# Patient Record
Sex: FEMALE | Employment: UNEMPLOYED | URBAN - METROPOLITAN AREA
[De-identification: names, ages, dates, MRNs, and addresses within clinical notes are randomized per-mention and may not be internally consistent; named-entity substitution may affect disease eponyms.]

---
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
## 2023-08-11 ENCOUNTER — HOSPITAL ENCOUNTER (EMERGENCY)
Age: 23
Discharge: HOME | End: 2023-08-12
Payer: COMMERCIAL

## 2023-08-11 DIAGNOSIS — R10.2 PELVIC CRAMPING: ICD-10-CM

## 2023-08-11 DIAGNOSIS — Z20.2 POSSIBLE EXPOSURE TO STD: ICD-10-CM

## 2023-08-11 DIAGNOSIS — N76.0 BACTERIAL VAGINOSIS: Primary | ICD-10-CM

## 2023-08-11 DIAGNOSIS — B96.89 BACTERIAL VAGINOSIS: Primary | ICD-10-CM

## 2023-08-11 PROCEDURE — 99284 EMERGENCY DEPT VISIT MOD MDM: CPT

## 2023-08-11 PROCEDURE — 87210 SMEAR WET MOUNT SALINE/INK: CPT

## 2023-08-11 PROCEDURE — 87491 CHLMYD TRACH DNA AMP PROBE: CPT

## 2023-08-11 PROCEDURE — 87591 N.GONORRHOEAE DNA AMP PROB: CPT

## 2023-08-11 PROCEDURE — 87086 URINE CULTURE/COLONY COUNT: CPT

## 2023-08-11 PROCEDURE — 81001 URINALYSIS AUTO W/SCOPE: CPT

## 2023-08-11 ASSESSMENT — LIFESTYLE VARIABLES: HOW OFTEN DO YOU HAVE A DRINK CONTAINING ALCOHOL: NEVER

## 2023-08-12 VITALS
BODY MASS INDEX: 33.97 KG/M2 | WEIGHT: 230 LBS | HEART RATE: 96 BPM | OXYGEN SATURATION: 99 % | SYSTOLIC BLOOD PRESSURE: 158 MMHG | DIASTOLIC BLOOD PRESSURE: 75 MMHG | RESPIRATION RATE: 16 BRPM | TEMPERATURE: 97.6 F

## 2023-08-12 LAB
BACTERIA URNS QL MICRO: ABNORMAL
BILIRUB UR QL STRIP: NEGATIVE
CLARITY UR: ABNORMAL
CLUE CELLS VAG QL WET PREP: ABNORMAL
COLOR UR: YELLOW
EPI CELLS #/AREA URNS HPF: ABNORMAL /HPF
GLUCOSE UR STRIP-MCNC: NEGATIVE MG/DL
HCG, URINE, POC: NEGATIVE
HGB UR QL STRIP.AUTO: ABNORMAL
KETONES UR STRIP-MCNC: NEGATIVE MG/DL
LEUKOCYTE ESTERASE UR QL STRIP: ABNORMAL
Lab: NORMAL
NEGATIVE QC PASS/FAIL: NORMAL
NITRITE UR QL STRIP: NEGATIVE
PH UR STRIP: 6 [PH] (ref 5–9)
POSITIVE QC PASS/FAIL: NORMAL
PROT UR STRIP-MCNC: NEGATIVE MG/DL
RBC #/AREA URNS HPF: ABNORMAL /HPF
SOURCE WET PREP: ABNORMAL
SP GR UR STRIP: 1.02 (ref 1–1.03)
T VAGINALIS VAG QL WET PREP: ABNORMAL
UROBILINOGEN UR STRIP-ACNC: 0.2 EU/DL (ref 0–1)
WBC #/AREA URNS HPF: ABNORMAL /HPF
YEAST WET PREP: ABNORMAL

## 2023-08-12 PROCEDURE — 96372 THER/PROPH/DIAG INJ SC/IM: CPT

## 2023-08-12 PROCEDURE — 6360000002 HC RX W HCPCS: Performed by: PHYSICIAN ASSISTANT

## 2023-08-12 PROCEDURE — 6370000000 HC RX 637 (ALT 250 FOR IP): Performed by: PHYSICIAN ASSISTANT

## 2023-08-12 RX ORDER — METRONIDAZOLE 500 MG/1
500 TABLET ORAL ONCE
Status: COMPLETED | OUTPATIENT
Start: 2023-08-12 | End: 2023-08-12

## 2023-08-12 RX ORDER — CEFTRIAXONE 500 MG/1
500 INJECTION, POWDER, FOR SOLUTION INTRAMUSCULAR; INTRAVENOUS ONCE
Status: COMPLETED | OUTPATIENT
Start: 2023-08-12 | End: 2023-08-12

## 2023-08-12 RX ORDER — METRONIDAZOLE 500 MG/1
500 TABLET ORAL 2 TIMES DAILY
Qty: 14 TABLET | Refills: 0 | Status: SHIPPED | OUTPATIENT
Start: 2023-08-12 | End: 2023-08-19

## 2023-08-12 RX ORDER — AZITHROMYCIN 250 MG/1
1000 TABLET, FILM COATED ORAL ONCE
Status: COMPLETED | OUTPATIENT
Start: 2023-08-12 | End: 2023-08-12

## 2023-08-12 RX ADMIN — METRONIDAZOLE 500 MG: 500 TABLET ORAL at 00:39

## 2023-08-12 RX ADMIN — CEFTRIAXONE SODIUM 500 MG: 500 INJECTION, POWDER, FOR SOLUTION INTRAMUSCULAR; INTRAVENOUS at 00:37

## 2023-08-12 RX ADMIN — AZITHROMYCIN 1000 MG: 250 TABLET, FILM COATED ORAL at 00:37

## 2023-08-12 ASSESSMENT — ENCOUNTER SYMPTOMS
ABDOMINAL PAIN: 0
SHORTNESS OF BREATH: 0
COLOR CHANGE: 0
CONSTIPATION: 0
COUGH: 0
NAUSEA: 0
CHEST TIGHTNESS: 0
BACK PAIN: 0
VOMITING: 0
DIARRHEA: 0

## 2023-08-13 LAB
MICROORGANISM SPEC CULT: NO GROWTH
SPECIMEN DESCRIPTION: NORMAL

## 2023-08-15 LAB
C TRACH DNA SPEC QL PROBE+SIG AMP: NEGATIVE
N GONORRHOEA DNA SPEC QL PROBE+SIG AMP: NEGATIVE
SPECIMEN DESCRIPTION: NORMAL

## 2023-08-25 ENCOUNTER — OFFICE VISIT (OUTPATIENT)
Age: 23
End: 2023-08-25

## 2023-08-25 VITALS
HEART RATE: 94 BPM | BODY MASS INDEX: 34.35 KG/M2 | SYSTOLIC BLOOD PRESSURE: 119 MMHG | HEIGHT: 69 IN | WEIGHT: 231.9 LBS | DIASTOLIC BLOOD PRESSURE: 82 MMHG

## 2023-08-25 DIAGNOSIS — N94.6 DYSMENORRHEA: Primary | ICD-10-CM

## 2023-08-25 RX ORDER — ETHYNODIOL DIACETATE AND ETHINYL ESTRADIOL 1 MG-35MCG
1 KIT ORAL DAILY
Qty: 3 PACKET | Refills: 4 | Status: SHIPPED | OUTPATIENT
Start: 2023-08-25

## 2023-08-25 SDOH — ECONOMIC STABILITY: HOUSING INSECURITY
IN THE LAST 12 MONTHS, WAS THERE A TIME WHEN YOU DID NOT HAVE A STEADY PLACE TO SLEEP OR SLEPT IN A SHELTER (INCLUDING NOW)?: NO

## 2023-08-25 SDOH — ECONOMIC STABILITY: FOOD INSECURITY: WITHIN THE PAST 12 MONTHS, YOU WORRIED THAT YOUR FOOD WOULD RUN OUT BEFORE YOU GOT MONEY TO BUY MORE.: NEVER TRUE

## 2023-08-25 SDOH — ECONOMIC STABILITY: FOOD INSECURITY: WITHIN THE PAST 12 MONTHS, THE FOOD YOU BOUGHT JUST DIDN'T LAST AND YOU DIDN'T HAVE MONEY TO GET MORE.: NEVER TRUE

## 2023-08-25 SDOH — ECONOMIC STABILITY: INCOME INSECURITY: HOW HARD IS IT FOR YOU TO PAY FOR THE VERY BASICS LIKE FOOD, HOUSING, MEDICAL CARE, AND HEATING?: NOT HARD AT ALL

## 2023-08-25 ASSESSMENT — PATIENT HEALTH QUESTIONNAIRE - PHQ9
2. FEELING DOWN, DEPRESSED OR HOPELESS: 0
SUM OF ALL RESPONSES TO PHQ QUESTIONS 1-9: 0

## 2023-08-25 ASSESSMENT — ENCOUNTER SYMPTOMS: BACK PAIN: 1

## 2023-10-03 ENCOUNTER — TELEPHONE (OUTPATIENT)
Age: 23
End: 2023-10-03

## 2023-10-03 NOTE — TELEPHONE ENCOUNTER
10/3/2023    V[facility] Bernard   [phone]    No LMP recorded. 108/3/23  Birth Control: Not on birthcontrol    Pain: Patients with UTI Sx / Abd Pain   OB patient No   Nausea: No   Chills: No   Passing gas: Yes   Fever: No   Temp: No temp   Location of pain: No Pain   Duration of pain: 0 Pain     Medications:  Current Outpatient Medications   Medication Sig Dispense Refill    ethynodiol-ethinyl estradiol (ZOVIA 1/35E, 28,) 1-35 MG-MCG per tablet Take 1 tablet by mouth daily 3 packet 4     No current facility-administered medications for this visit. Bleeding: How much: Normal on period   Duration: 1 day   Number of pads used daily: 11    Other complaints: Pt thinks she has a yeast infection. She has some burning and itching. Wanted to see if you would call in an antibiotic. Pharmacy: [facility].    Allergies: Sulfa antibiotics   Allergies   Allergen Reactions    Sulfa Antibiotics      Other reaction(s): Unknown  Other reaction(s): FACIAL SWELLING

## 2023-10-03 NOTE — TELEPHONE ENCOUNTER
Yeast infections are not treated with antibiotics.   If she thinks she has a yeast infection, I would be happy to see her for a pelvic exam, if she does not want to try Monistat over-the-counter first.

## 2023-10-11 ENCOUNTER — OFFICE VISIT (OUTPATIENT)
Age: 23
End: 2023-10-11
Payer: COMMERCIAL

## 2023-10-11 VITALS
SYSTOLIC BLOOD PRESSURE: 113 MMHG | HEIGHT: 69 IN | BODY MASS INDEX: 34.07 KG/M2 | HEART RATE: 108 BPM | WEIGHT: 230 LBS | DIASTOLIC BLOOD PRESSURE: 80 MMHG

## 2023-10-11 DIAGNOSIS — N89.8 VAGINAL ITCHING: Primary | ICD-10-CM

## 2023-10-11 PROCEDURE — 99213 OFFICE O/P EST LOW 20 MIN: CPT | Performed by: LEGAL MEDICINE

## 2023-10-11 RX ORDER — DOXYCYCLINE HYCLATE 100 MG/1
100 CAPSULE ORAL 2 TIMES DAILY
COMMUNITY
Start: 2023-09-26

## 2023-10-11 RX ORDER — FLUCONAZOLE 150 MG/1
TABLET ORAL
Qty: 1 TABLET | Refills: 0 | Status: SHIPPED | OUTPATIENT
Start: 2023-10-11

## 2023-10-11 RX ORDER — CLOTRIMAZOLE AND BETAMETHASONE DIPROPIONATE 10; .64 MG/G; MG/G
CREAM TOPICAL
Qty: 30 G | Refills: 1 | Status: SHIPPED | OUTPATIENT
Start: 2023-10-11

## 2023-10-11 ASSESSMENT — ENCOUNTER SYMPTOMS
ABDOMINAL DISTENTION: 0
NAUSEA: 0
DIARRHEA: 0
ABDOMINAL PAIN: 0
VOMITING: 0
BLOOD IN STOOL: 0
RECTAL PAIN: 0
CONSTIPATION: 0

## 2023-10-11 NOTE — PATIENT INSTRUCTIONS
Boric acid 600 mg per vagina every evening for 14 days. Can find through [facility]. Blood test to check for diabetes was ordered for you.

## 2023-10-13 LAB
CULTURE: NORMAL
SPECIMEN DESCRIPTION: NORMAL

## 2023-10-14 LAB
C TRACH DNA GENITAL QL NAA+PROBE: NEGATIVE
N. GONORRHOEAE DNA: NEGATIVE

## 2023-11-22 ENCOUNTER — TELEPHONE (OUTPATIENT)
Age: 23
End: 2023-11-22

## 2024-03-15 ENCOUNTER — TELEPHONE (OUTPATIENT)
Age: 24
End: 2024-03-15

## 2024-03-15 NOTE — TELEPHONE ENCOUNTER
Patient called in stating she was informed by her partner that he has syphilis today , c/o all over itching and vaginal lesion that burns when she urinates. and she will need treated .patient also states she would like to be tested HSV 2. Please advise?

## 2024-03-16 ENCOUNTER — HOSPITAL ENCOUNTER (EMERGENCY)
Facility: HOSPITAL | Age: 24
Discharge: HOME | End: 2024-03-17
Attending: STUDENT IN AN ORGANIZED HEALTH CARE EDUCATION/TRAINING PROGRAM
Payer: COMMERCIAL

## 2024-03-16 VITALS
DIASTOLIC BLOOD PRESSURE: 85 MMHG | OXYGEN SATURATION: 99 % | BODY MASS INDEX: 31.1 KG/M2 | RESPIRATION RATE: 98 BRPM | TEMPERATURE: 95.7 F | HEART RATE: 111 BPM | WEIGHT: 210 LBS | SYSTOLIC BLOOD PRESSURE: 125 MMHG | HEIGHT: 69 IN

## 2024-03-16 DIAGNOSIS — N76.0 BACTERIAL VAGINOSIS: ICD-10-CM

## 2024-03-16 DIAGNOSIS — N76.6 GENITAL LABIAL ULCER: Primary | ICD-10-CM

## 2024-03-16 DIAGNOSIS — B96.89 BACTERIAL VAGINOSIS: ICD-10-CM

## 2024-03-16 PROCEDURE — 87529 HSV DNA AMP PROBE: CPT | Mod: PORLAB | Performed by: STUDENT IN AN ORGANIZED HEALTH CARE EDUCATION/TRAINING PROGRAM

## 2024-03-16 PROCEDURE — 86780 TREPONEMA PALLIDUM: CPT | Mod: PORLAB | Performed by: STUDENT IN AN ORGANIZED HEALTH CARE EDUCATION/TRAINING PROGRAM

## 2024-03-16 PROCEDURE — 81001 URINALYSIS AUTO W/SCOPE: CPT | Performed by: STUDENT IN AN ORGANIZED HEALTH CARE EDUCATION/TRAINING PROGRAM

## 2024-03-16 PROCEDURE — 36415 COLL VENOUS BLD VENIPUNCTURE: CPT | Performed by: STUDENT IN AN ORGANIZED HEALTH CARE EDUCATION/TRAINING PROGRAM

## 2024-03-16 PROCEDURE — 87800 DETECT AGNT MULT DNA DIREC: CPT | Mod: PORLAB | Performed by: STUDENT IN AN ORGANIZED HEALTH CARE EDUCATION/TRAINING PROGRAM

## 2024-03-16 PROCEDURE — 99283 EMERGENCY DEPT VISIT LOW MDM: CPT

## 2024-03-16 PROCEDURE — 87389 HIV-1 AG W/HIV-1&-2 AB AG IA: CPT | Mod: PORLAB | Performed by: STUDENT IN AN ORGANIZED HEALTH CARE EDUCATION/TRAINING PROGRAM

## 2024-03-16 PROCEDURE — 87210 SMEAR WET MOUNT SALINE/INK: CPT | Performed by: STUDENT IN AN ORGANIZED HEALTH CARE EDUCATION/TRAINING PROGRAM

## 2024-03-16 PROCEDURE — 87109 MYCOPLASMA: CPT | Performed by: STUDENT IN AN ORGANIZED HEALTH CARE EDUCATION/TRAINING PROGRAM

## 2024-03-16 PROCEDURE — 81025 URINE PREGNANCY TEST: CPT | Performed by: STUDENT IN AN ORGANIZED HEALTH CARE EDUCATION/TRAINING PROGRAM

## 2024-03-16 ASSESSMENT — COLUMBIA-SUICIDE SEVERITY RATING SCALE - C-SSRS
2. HAVE YOU ACTUALLY HAD ANY THOUGHTS OF KILLING YOURSELF?: NO
6. HAVE YOU EVER DONE ANYTHING, STARTED TO DO ANYTHING, OR PREPARED TO DO ANYTHING TO END YOUR LIFE?: NO
1. IN THE PAST MONTH, HAVE YOU WISHED YOU WERE DEAD OR WISHED YOU COULD GO TO SLEEP AND NOT WAKE UP?: NO

## 2024-03-16 ASSESSMENT — PAIN - FUNCTIONAL ASSESSMENT: PAIN_FUNCTIONAL_ASSESSMENT: 0-10

## 2024-03-16 ASSESSMENT — PAIN SCALES - GENERAL: PAINLEVEL_OUTOF10: 0 - NO PAIN

## 2024-03-17 LAB
APPEARANCE UR: CLEAR
BILIRUB UR STRIP.AUTO-MCNC: NEGATIVE MG/DL
C TRACH RRNA SPEC QL NAA+PROBE: NEGATIVE
CLUE CELLS SPEC QL WET PREP: PRESENT
COLOR UR: YELLOW
GLUCOSE UR STRIP.AUTO-MCNC: NEGATIVE MG/DL
HCG UR QL IA.RAPID: NEGATIVE
HIV 1+2 AB+HIV1 P24 AG SERPL QL IA: NONREACTIVE
HSV1 DNA SKIN QL NAA+PROBE: NOT DETECTED
HSV2 DNA SKIN QL NAA+PROBE: NOT DETECTED
KETONES UR STRIP.AUTO-MCNC: NEGATIVE MG/DL
LEUKOCYTE ESTERASE UR QL STRIP.AUTO: ABNORMAL
N GONORRHOEA DNA SPEC QL PROBE+SIG AMP: NEGATIVE
NITRITE UR QL STRIP.AUTO: NEGATIVE
PH UR STRIP.AUTO: 7 [PH]
PROT UR STRIP.AUTO-MCNC: NEGATIVE MG/DL
RBC # UR STRIP.AUTO: NEGATIVE /UL
RBC #/AREA URNS AUTO: NORMAL /HPF
SP GR UR STRIP.AUTO: 1.02
SQUAMOUS #/AREA URNS AUTO: NORMAL /HPF
T VAGINALIS SPEC QL WET PREP: ABNORMAL
TREPONEMA PALLIDUM IGG+IGM AB [PRESENCE] IN SERUM OR PLASMA BY IMMUNOASSAY: NONREACTIVE
UROBILINOGEN UR STRIP.AUTO-MCNC: <2 MG/DL
WBC #/AREA URNS AUTO: NORMAL /HPF
WBC VAG QL WET PREP: ABNORMAL
YEAST VAG QL WET PREP: ABNORMAL

## 2024-03-17 PROCEDURE — 2500000004 HC RX 250 GENERAL PHARMACY W/ HCPCS (ALT 636 FOR OP/ED): Mod: JZ | Performed by: STUDENT IN AN ORGANIZED HEALTH CARE EDUCATION/TRAINING PROGRAM

## 2024-03-17 PROCEDURE — 96372 THER/PROPH/DIAG INJ SC/IM: CPT

## 2024-03-17 RX ORDER — METRONIDAZOLE 500 MG/1
500 TABLET ORAL 2 TIMES DAILY
Qty: 14 TABLET | Refills: 0 | Status: SHIPPED | OUTPATIENT
Start: 2024-03-17 | End: 2024-03-24

## 2024-03-17 RX ADMIN — PENICILLIN G BENZATHINE 2.4 MILLION UNITS: 1200000 INJECTION, SUSPENSION INTRAMUSCULAR at 00:30

## 2024-03-17 NOTE — ED PROVIDER NOTES
Chief Complaint   Patient presents with    Exposure to STD     Std exposure 2 weeks ago, yellow discharge, dysuria and lesion         HPI:  24 y.o. female presenting to the ED with concern for STI exposure.  Patient reports her significant other test for syphilis and mycoplasma.  She was just recently notified of this.  She had developed a lesion which was swabbed [facility] and she was started on acyclovir for this and is currently on day 9/10.  She says lesion is painful but only with urination. She does report mild burning pain all over her body.  She has also had yellow vaginal discharge, which is new over the past 4 days.  No unusual vaginal bleeding.  No abdominal pain, nausea, or vomiting.    History provided by: patient  Limitations to history: none    ------------------------------------------------------------------------------------------------------------------------------------------    ED Triage Vitals [03/16/24 2205]   Temperature Heart Rate Respirations BP   35.4 °C (95.7 °F) (!) 111 (!) 98 125/85      Pulse Ox Temp Source Heart Rate Source Patient Position   99 % Temporal Monitor --      BP Location FiO2 (%)     -- --         Physical Exam:  Triage vitals reviewed.  Constitutional: Well developed adult in no acute distress, non toxic in appearance  Head: Normocephalic, atraumatic  Skin: Intact, dry. No rashes or lesions.  Eyes: Pupils are equal. No conjunctival injection.  Neck: Supple. Trachea is midline.  Pulmonary: Normal work of breathing with no accessory muscle use noted.  Clear to auscultation bilaterally.   Cardiovascular: RRR. 2+ radial pulses bilaterally.   Abdomen: Soft, nondistended. Nontender to palpation.  : Healing lesion on border of labia majora and perineum just right of midline.  White mucus discharge noted in posterior vaginal vault.  No cervical lesions.  No CMT or adnexal tenderness.  Extremities: No gross deformities.  Moving all extremities spontaneously without  difficulty.  Neuro: Awake and alert. Face is symmetric.  Speech is clear. No obvious focal findings.  Psych: Appropriate mood and affect.    ------------------------------------------------------------------------------------------------------------------------------------------    Medical Decision Making:  This patient is a 24 y.o. female who is presenting to the ED for STI check after known exposure to syphilis and mycoplasma.  Additionally was recently diagnosed with HSV and is completing treatment for about.  On exam she does have a healing lesion that is not vesicular but could be a small ulceration versus tear.  She also has some white mucus-like discharge.  Wet prep and swabs for G/C, HSV, and mycoplasma were sent.  Serum testing for syphilis and HIV also obtained.  Patient is requesting treatment for syphilis given her partner was positive and was given a single dose of penicillin IM.  She did test positive for clue cells and was given a prescription for metronidazole, as well as counseled on side effects.  Was encouraged to complete her acyclovir for prior positive HSV test.  She was offered gonorrhea/chlamydia preemptive treatment but would like to wait for these results to return.    Patient was instructed on how to follow-up on her results in [facility] and may require return to ED if results are positive. She was instructed to abstain from sexual activity until results have returned and if positive until she has completed treatment and all partners are tested. She expressed understanding and all questions were answered.  Discharged in stable condition.    Diagnoses as of 03/30/24 2240   Genital labial ulcer   Bacterial vaginosis        Clinical Impression:  Labial ulcer  Bacterial vaginosis  Exposure to STI    Disposition:  Discharge to home    Marianela Ron DO  Emergency Medicine         Marianela Ron DO  03/30/24 3949

## 2024-03-22 LAB — UREAPLASMA/MYCOPLASMA CULTURE: NORMAL

## 2024-08-21 ENCOUNTER — APPOINTMENT (OUTPATIENT)
Dept: OBSTETRICS AND GYNECOLOGY | Facility: CLINIC | Age: 24
End: 2024-08-21
Payer: COMMERCIAL

## 2024-08-21 ENCOUNTER — LAB (OUTPATIENT)
Dept: LAB | Facility: LAB | Age: 24
End: 2024-08-21
Payer: COMMERCIAL

## 2024-08-21 VITALS
DIASTOLIC BLOOD PRESSURE: 70 MMHG | SYSTOLIC BLOOD PRESSURE: 120 MMHG | WEIGHT: 180 LBS | BODY MASS INDEX: 26.66 KG/M2 | HEIGHT: 69 IN

## 2024-08-21 DIAGNOSIS — Z11.3 SCREENING FOR STDS (SEXUALLY TRANSMITTED DISEASES): ICD-10-CM

## 2024-08-21 DIAGNOSIS — N89.8 VAGINAL ITCHING: ICD-10-CM

## 2024-08-21 DIAGNOSIS — R35.0 URINARY FREQUENCY: ICD-10-CM

## 2024-08-21 DIAGNOSIS — Z01.419 WELL WOMAN EXAM WITH ROUTINE GYNECOLOGICAL EXAM: Primary | ICD-10-CM

## 2024-08-21 PROBLEM — B00.9 HSV (HERPES SIMPLEX VIRUS) INFECTION: Status: ACTIVE | Noted: 2024-08-21

## 2024-08-21 LAB
POC APPEARANCE, URINE: CLEAR
POC BILIRUBIN, URINE: NEGATIVE
POC BLOOD, URINE: NEGATIVE
POC COLOR, URINE: YELLOW
POC GLUCOSE, URINE: NEGATIVE MG/DL
POC KETONES, URINE: NEGATIVE MG/DL
POC LEUKOCYTES, URINE: NEGATIVE
POC NITRITE,URINE: NEGATIVE
POC PH, URINE: 5 PH
POC PROTEIN, URINE: NEGATIVE MG/DL
POC SPECIFIC GRAVITY, URINE: 1.01
POC UROBILINOGEN, URINE: 0.2 EU/DL
TREPONEMA PALLIDUM IGG+IGM AB [PRESENCE] IN SERUM OR PLASMA BY IMMUNOASSAY: NONREACTIVE

## 2024-08-21 PROCEDURE — 3008F BODY MASS INDEX DOCD: CPT | Performed by: NURSE PRACTITIONER

## 2024-08-21 PROCEDURE — 87205 SMEAR GRAM STAIN: CPT

## 2024-08-21 PROCEDURE — 87591 N.GONORRHOEAE DNA AMP PROB: CPT

## 2024-08-21 PROCEDURE — 86780 TREPONEMA PALLIDUM: CPT

## 2024-08-21 PROCEDURE — 81002 URINALYSIS NONAUTO W/O SCOPE: CPT | Performed by: NURSE PRACTITIONER

## 2024-08-21 PROCEDURE — 99385 PREV VISIT NEW AGE 18-39: CPT | Performed by: NURSE PRACTITIONER

## 2024-08-21 PROCEDURE — 87491 CHLMYD TRACH DNA AMP PROBE: CPT

## 2024-08-21 PROCEDURE — 1036F TOBACCO NON-USER: CPT | Performed by: NURSE PRACTITIONER

## 2024-08-21 PROCEDURE — 87661 TRICHOMONAS VAGINALIS AMPLIF: CPT

## 2024-08-21 PROCEDURE — 36415 COLL VENOUS BLD VENIPUNCTURE: CPT

## 2024-08-21 ASSESSMENT — ENCOUNTER SYMPTOMS
PSYCHIATRIC NEGATIVE: 1
EYES NEGATIVE: 1
CONSTITUTIONAL NEGATIVE: 1
ALLERGIC/IMMUNOLOGIC NEGATIVE: 1
CARDIOVASCULAR NEGATIVE: 1
NEUROLOGICAL NEGATIVE: 1
HEMATOLOGIC/LYMPHATIC NEGATIVE: 1
ENDOCRINE NEGATIVE: 1
MUSCULOSKELETAL NEGATIVE: 1
FREQUENCY: 1
RESPIRATORY NEGATIVE: 1
GASTROINTESTINAL NEGATIVE: 1

## 2024-08-21 NOTE — PROGRESS NOTES
"Chief Complaint    Annual Exam        HPI    NEW PATIENT - ANNUAL - FREQUENT URINATION, VAGINAL ITCHING    PAP - 06/20/2023 NEG  STD SCREENING - 03/2024 NEG / NEG  GARDASIL - NEVER    CHAPERONE - DECLINED    Last edited by Ursula Kidd MA on 8/21/2024 11:21 AM.         24 y.o. G0 female presents for annual well woman exam.  She is new with the practice.    Patient's menstrual cycles are regular every 28 days, lasting 3-4 days. Denies abnormal bleeding.  She is sexually active. Reports 3 partner(s) in the last year. Denies dyspareunia.   Condoms: Denies. Had Kyleena IUD in the past, issues with pain so had it removed. Declines need for birth control at this time. Advised to take daily PNV.   Amendable STD testing today. H/O chlamydia in the remote past. 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   She denies any breast concerns.   Pap hx. normal. Last pap: 06/0223 negative. She received x1 dose of Gardasil and had bad reaction.   Denies pelvic pain.  Reports vaginal itching. Onset a few days ago. Denies abnormal vaginal discharge, odor.   Reports urinary frequency, urgency. Denies dysuria.   Denies bowel concerns.   She is non-smoker. Occasionally vapes. Counseled cessation.   PMH: None  Family h/o breast cancer: None  Family h/o GYN cancer: Mother - cervical  Family h/o colon cancer: None  Graduated with finance degree. Working as  at [employer] in [place].     /70   Ht 1.753 m (5' 9\")   Wt 81.6 kg (180 lb)   LMP 08/11/2024 (Exact Date)   BMI 26.58 kg/m²      No current outpatient medications     Review of Systems   Constitutional: Negative.    HENT: Negative.     Eyes: Negative.    Respiratory: Negative.     Cardiovascular: Negative.    Gastrointestinal: Negative.    Endocrine: Negative.    Genitourinary:  Positive for frequency.        +Vaginal itching   Musculoskeletal: Negative.    Skin: Negative.    Allergic/Immunologic: Negative.    Neurological: Negative.    Hematological: Negative.  "   Psychiatric/Behavioral: Negative.     All other systems reviewed and are negative.       Physical Exam  Constitutional:       Appearance: Normal appearance.   HENT:      Head: Normocephalic.      Nose: Nose normal.   Cardiovascular:      Rate and Rhythm: Normal rate and regular rhythm.   Pulmonary:      Effort: Pulmonary effort is normal.      Breath sounds: Normal breath sounds.   Chest:   Breasts:     Right: Normal.      Left: Normal.   Abdominal:      General: Abdomen is flat.      Palpations: Abdomen is soft.   Genitourinary:     General: Normal vulva.      Vagina: Normal.      Cervix: Normal.      Uterus: Normal.       Adnexa: Right adnexa normal and left adnexa normal.      Rectum: Normal.   Musculoskeletal:         General: Normal range of motion.      Cervical back: Normal range of motion and neck supple.   Skin:     General: Skin is warm and dry.   Neurological:      Mental Status: She is alert.   Psychiatric:         Mood and Affect: Mood normal.         Behavior: Behavior normal.          Assessment/Plan:   1. Well woman exam with routine gynecological exam  -Pap test not collected today. Last pap was in 06/2023 and was negative. Guidelines discussed.   -STD screening today.   -Self breast awareness exams reviewed.  -Advised yearly well woman exams.   -Follow up sooner if needed.     2. Screening for STDs (sexually transmitted diseases)  -Requests cultures and blood work for syphilis, collected and ordered today:  - Syphilis Screen with Reflex; Future  - C. Trachomatis / N. Gonorrhoeae, Amplified Detection  - Trichomonas vaginalis, Amplified     3. Urinary frequency  -POCT UA (nonautomated) manually resulted  -Urine dip negative.     4. Vaginal itching  -Collected: Vaginitis Gram Stain For Bacterial Vaginosis + Yeast  -Will notify of results.  -Vulvar skin care reviewed.

## 2024-08-22 LAB
C TRACH RRNA SPEC QL NAA+PROBE: NEGATIVE
CLUE CELLS VAG LPF-#/AREA: NORMAL /[LPF]
N GONORRHOEA DNA SPEC QL PROBE+SIG AMP: NEGATIVE
NUGENT SCORE: 1
T VAGINALIS RRNA SPEC QL NAA+PROBE: NEGATIVE
YEAST VAG WET PREP-#/AREA: NORMAL

## 2024-10-17 ENCOUNTER — OFFICE VISIT (OUTPATIENT)
Dept: OBSTETRICS AND GYNECOLOGY | Facility: CLINIC | Age: 24
End: 2024-10-17
Payer: COMMERCIAL

## 2024-10-17 VITALS
SYSTOLIC BLOOD PRESSURE: 114 MMHG | HEIGHT: 69 IN | WEIGHT: 178 LBS | DIASTOLIC BLOOD PRESSURE: 80 MMHG | BODY MASS INDEX: 26.36 KG/M2

## 2024-10-17 DIAGNOSIS — Z11.3 SCREENING FOR STDS (SEXUALLY TRANSMITTED DISEASES): ICD-10-CM

## 2024-10-17 DIAGNOSIS — R10.2 PELVIC PAIN IN FEMALE: Primary | ICD-10-CM

## 2024-10-17 PROCEDURE — 3008F BODY MASS INDEX DOCD: CPT | Performed by: NURSE PRACTITIONER

## 2024-10-17 PROCEDURE — 1036F TOBACCO NON-USER: CPT | Performed by: NURSE PRACTITIONER

## 2024-10-17 PROCEDURE — 87491 CHLMYD TRACH DNA AMP PROBE: CPT

## 2024-10-17 PROCEDURE — 87591 N.GONORRHOEAE DNA AMP PROB: CPT

## 2024-10-17 PROCEDURE — 99213 OFFICE O/P EST LOW 20 MIN: CPT | Performed by: NURSE PRACTITIONER

## 2024-10-17 ASSESSMENT — ENCOUNTER SYMPTOMS
ABDOMINAL PAIN: 1
WEIGHT LOSS: 0
ARTHRALGIAS: 0
ANOREXIA: 0
HEMATOCHEZIA: 0
FEVER: 0
FLATUS: 0
HEADACHES: 0
DYSURIA: 0
BELCHING: 0
VOMITING: 0
DIARRHEA: 0
FREQUENCY: 0
CONSTIPATION: 0
MYALGIAS: 0
HEMATURIA: 0
NAUSEA: 0

## 2024-10-17 NOTE — PROGRESS NOTES
"  Chief Complaint    Pelvic Pain         HPI    PATIENT DECLINES CHAPERONE    PATIENT HAS BEEN HAVING A SHARP STABBING PAIN FROM THE MIDDLE OF HER ABDOMIN TO HER RT PELVIC AREA AND SHOOTS DOWN TO VAGINAL AREA. PATIENT STATES THIS HAS BEEN GOING ON FOR 3 DAY.   Last edited by Lo Etienne MA on 10/17/2024  2:25 PM.         24 y.o. G0 female presents with pelvic pain.   Onset of pain last month.   Pain is located right lower side, under belly button, radiating to vaginal canal.   Describes pain as sharp and intermittent. Lasts for only a minute at a time.   Denies symptoms of fever, chills.  Denies nausea, vomiting, diarrhea, constipation.  Denies urinary symptoms.  Denies abnormal vaginal discharge, itching, odor.  Patient's menstrual cycles are regular every 28 days, lasting 3-4 days. Denies abnormal bleeding. LMP was 10/03/2024 and was normal.   She is not currently sexually active. H/O chlamydia, HSV in the past. Also states today she was told she had mycoplasma in ER in March but does not think she was treated for it.   PMH: None  Family h/o breast cancer: None  Family h/o GYN cancer: Mother - cervical  Family h/o colon cancer: None    /80   Ht 1.753 m (5' 9\")   Wt 80.7 kg (178 lb)   LMP 10/03/2024 (Exact Date)   BMI 26.29 kg/m²      No current outpatient medications     Review of Systems   Constitutional:  Negative for fever.   Gastrointestinal:  Positive for abdominal pain. Negative for constipation, diarrhea, nausea and vomiting.   Genitourinary:  Positive for pelvic pain. Negative for dysuria, frequency and hematuria.   Musculoskeletal:  Negative for arthralgias and myalgias.   Neurological:  Negative for headaches.   All other systems reviewed and are negative.       Physical Exam  Constitutional:       Appearance: Normal appearance.   HENT:      Head: Normocephalic.      Nose: Nose normal.   Pulmonary:      Effort: Pulmonary effort is normal.   Genitourinary:     General: Normal vulva.      " Vagina: Normal.      Cervix: Normal. No cervical motion tenderness.      Uterus: Normal.       Adnexa: Right adnexa normal and left adnexa normal.   Musculoskeletal:         General: Normal range of motion.      Cervical back: Normal range of motion and neck supple.   Neurological:      Mental Status: She is alert.   Psychiatric:         Mood and Affect: Mood normal.         Behavior: Behavior normal.          Assessment/Plan:  1. Pelvic pain in female (Primary)  -Possible etiologies include gynecological vs. urinary vs. gastrointestinal sources of pain.  -Order provided for pelvic ultrasound for evaluation, patient to schedule.  - US PELVIS TRANSABDOMINAL WITH TRANSVAGINAL; Future  -Will call with results to discuss.  -May take Ibuprofen 600 mg every 6 hours as needed for pain.  -Go to ED for any sudden, severe pain that does not resolve or for any emergencies as needed.     2. Screening for STDs (sexually transmitted diseases)  -Collected: C. trachomatis / N. gonorrhoeae, Amplified   -[facility] collected as well.  -Will be in touch with results.

## 2024-10-18 ENCOUNTER — TELEPHONE (OUTPATIENT)
Dept: OBSTETRICS AND GYNECOLOGY | Facility: CLINIC | Age: 24
End: 2024-10-18
Payer: COMMERCIAL

## 2024-10-18 LAB
C TRACH RRNA SPEC QL NAA+PROBE: NEGATIVE
N GONORRHOEA DNA SPEC QL PROBE+SIG AMP: NEGATIVE

## 2024-10-18 NOTE — TELEPHONE ENCOUNTER
PATIENT WAS SEEN IN THE OFFICE FOR PELVIC AND VAGINAL PAIN. PATIENT HAD CULTURE COLLECTION THAT WAS PLACED IN A THIN PREP BOTTLE FOR [facility] TO .   AFTER CALLING [facility] TO HAVE SPECIMEN PICKED UP THEY STATED THEY NO LONGER HANDLE OUR ACCOUNT AND THAT IT GOES TO [facility]  CALLED [facility] TO HAVE SPECIMEN PICKED UP, AND THEY DID BEFORE I LEFT.   CALLED [facility] TODAY TO MAKE SURE THEY GOT IT AND WAS ABLE TO DO THE TESTING THAT WAS ORDERED. THE ORDER WAS PUT ON A [facility] REQ SHEET BUT WAS CLEARLY DONE, ORDERING UROGENITAL MYCOPLASMA AND UREAPLASMA PANEL.   [facility] STATES THAT THE BOTTLE IT WAS COLLECTED IN IS NOT THEIR PREFERRED WAY TO COLLECT THAT KIND OF SPECIMEN AND IS TO BE DONE WITH AN [facility].   [facility] HAD ME ON HOLD FOR OVER 30 MINUTES TO SEE IF THEY CAN SEND SPECIMEN BACK TO OFFICE SO WE CAN SEND TO [facility] INSTEAD. AFTER 30 MINUTES OF WAITING ON THE PHONE GOT DISCONNECTED. IF UNABLE TO DO TESTING, [facility] WILL DO TESTING A DIFFERENT WAY.

## 2024-10-21 ENCOUNTER — HOSPITAL ENCOUNTER (OUTPATIENT)
Dept: RADIOLOGY | Facility: HOSPITAL | Age: 24
Discharge: HOME | End: 2024-10-21
Payer: COMMERCIAL

## 2024-10-21 DIAGNOSIS — R10.2 PELVIC PAIN IN FEMALE: ICD-10-CM

## 2024-10-21 PROCEDURE — 76856 US EXAM PELVIC COMPLETE: CPT

## 2024-10-21 PROCEDURE — 76856 US EXAM PELVIC COMPLETE: CPT | Performed by: STUDENT IN AN ORGANIZED HEALTH CARE EDUCATION/TRAINING PROGRAM

## 2024-10-23 DIAGNOSIS — Z11.3 SCREENING FOR STDS (SEXUALLY TRANSMITTED DISEASES): Primary | ICD-10-CM

## 2024-10-30 ENCOUNTER — LAB (OUTPATIENT)
Dept: LAB | Facility: LAB | Age: 24
End: 2024-10-30
Payer: COMMERCIAL

## 2024-10-30 DIAGNOSIS — Z11.3 SCREENING FOR STDS (SEXUALLY TRANSMITTED DISEASES): ICD-10-CM

## 2024-10-30 PROCEDURE — 87109 MYCOPLASMA: CPT

## 2024-11-02 LAB — UREAPLASMA/MYCOPLASMA CULTURE: NORMAL

## 2024-11-04 ENCOUNTER — TELEPHONE (OUTPATIENT)
Dept: OBSTETRICS AND GYNECOLOGY | Facility: CLINIC | Age: 24
End: 2024-11-04
Payer: COMMERCIAL

## 2024-11-04 DIAGNOSIS — N89.8 VAGINAL ITCHING: Primary | ICD-10-CM

## 2024-11-04 DIAGNOSIS — A49.3 MYCOPLASMA INFECTION: Primary | ICD-10-CM

## 2024-11-04 LAB — UREAPLASMA/MYCOPLASMA CULTURE: NORMAL

## 2024-11-04 RX ORDER — MOXIFLOXACIN HYDROCHLORIDE 400 MG/1
400 TABLET ORAL DAILY
Qty: 7 TABLET | Refills: 0 | Status: SHIPPED | OUTPATIENT
Start: 2024-11-04 | End: 2024-11-11

## 2024-11-04 RX ORDER — DOXYCYCLINE 100 MG/1
100 CAPSULE ORAL 2 TIMES DAILY
Qty: 14 CAPSULE | Refills: 0 | Status: SHIPPED | OUTPATIENT
Start: 2024-11-04 | End: 2024-11-11

## 2024-11-05 RX ORDER — FLUCONAZOLE 150 MG/1
TABLET ORAL
Qty: 1 TABLET | Refills: 1 | Status: SHIPPED | OUTPATIENT
Start: 2024-11-05

## 2024-11-18 ENCOUNTER — TELEPHONE (OUTPATIENT)
Dept: OBSTETRICS AND GYNECOLOGY | Facility: CLINIC | Age: 24
End: 2024-11-18
Payer: COMMERCIAL

## 2024-11-18 DIAGNOSIS — A49.3 MYCOPLASMA INFECTION: Primary | ICD-10-CM

## 2024-11-19 NOTE — TELEPHONE ENCOUNTER
Pt called. Finished antibiotics on 11/17. Retesting no sooner than 2 weeks after completing antibiotics recommended. Order placed.

## 2024-12-03 ENCOUNTER — LAB (OUTPATIENT)
Dept: LAB | Facility: LAB | Age: 24
End: 2024-12-03
Payer: COMMERCIAL

## 2024-12-03 DIAGNOSIS — A49.3 MYCOPLASMA INFECTION: ICD-10-CM

## 2024-12-11 ENCOUNTER — TELEPHONE (OUTPATIENT)
Dept: OBSTETRICS AND GYNECOLOGY | Facility: CLINIC | Age: 24
End: 2024-12-11
Payer: COMMERCIAL

## 2024-12-11 DIAGNOSIS — A49.3 MYCOPLASMA INFECTION: Primary | ICD-10-CM

## 2024-12-12 ENCOUNTER — TELEPHONE (OUTPATIENT)
Dept: OBSTETRICS AND GYNECOLOGY | Facility: CLINIC | Age: 24
End: 2024-12-12

## 2024-12-12 ENCOUNTER — LAB (OUTPATIENT)
Dept: LAB | Facility: LAB | Age: 24
End: 2024-12-12
Payer: COMMERCIAL

## 2024-12-12 DIAGNOSIS — A49.3 MYCOPLASMA INFECTION: ICD-10-CM

## 2024-12-12 PROCEDURE — 87109 MYCOPLASMA: CPT

## 2024-12-14 LAB — UREAPLASMA/MYCOPLASMA CULTURE: NORMAL

## 2024-12-18 LAB — UREAPLASMA/MYCOPLASMA CULTURE: NORMAL

## 2025-05-08 ASSESSMENT — ENCOUNTER SYMPTOMS
HEADACHES: 0
DYSURIA: 0
DIARRHEA: 0
FREQUENCY: 0
HEMATURIA: 0
FLANK PAIN: 1
ANOREXIA: 0
NAUSEA: 0
ABDOMINAL PAIN: 1
CHILLS: 0
FEVER: 0
SORE THROAT: 0
CONSTIPATION: 0
VOMITING: 0
BACK PAIN: 1

## 2025-05-14 ENCOUNTER — APPOINTMENT (OUTPATIENT)
Dept: OBSTETRICS AND GYNECOLOGY | Facility: CLINIC | Age: 25
End: 2025-05-14
Payer: COMMERCIAL

## 2025-05-14 VITALS — BODY MASS INDEX: 29.68 KG/M2 | DIASTOLIC BLOOD PRESSURE: 76 MMHG | SYSTOLIC BLOOD PRESSURE: 102 MMHG | WEIGHT: 201 LBS

## 2025-05-14 DIAGNOSIS — Z11.3 SCREENING FOR STDS (SEXUALLY TRANSMITTED DISEASES): Primary | ICD-10-CM

## 2025-05-14 DIAGNOSIS — N89.8 VAGINAL ITCHING: ICD-10-CM

## 2025-05-14 DIAGNOSIS — R10.2 PELVIC PAIN IN FEMALE: ICD-10-CM

## 2025-05-14 LAB
POC APPEARANCE, URINE: CLEAR
POC BILIRUBIN, URINE: NEGATIVE
POC BLOOD, URINE: NEGATIVE
POC COLOR, URINE: YELLOW
POC GLUCOSE, URINE: NEGATIVE MG/DL
POC KETONES, URINE: NEGATIVE MG/DL
POC LEUKOCYTES, URINE: ABNORMAL
POC NITRITE,URINE: NEGATIVE
POC PH, URINE: 6.5 PH
POC PROTEIN, URINE: NEGATIVE MG/DL
POC SPECIFIC GRAVITY, URINE: 1.01
POC UROBILINOGEN, URINE: 0.2 EU/DL

## 2025-05-14 PROCEDURE — 99213 OFFICE O/P EST LOW 20 MIN: CPT | Performed by: NURSE PRACTITIONER

## 2025-05-14 PROCEDURE — 1036F TOBACCO NON-USER: CPT | Performed by: NURSE PRACTITIONER

## 2025-05-14 PROCEDURE — 81003 URINALYSIS AUTO W/O SCOPE: CPT | Performed by: NURSE PRACTITIONER

## 2025-05-14 RX ORDER — NYSTATIN AND TRIAMCINOLONE ACETONIDE 100000; 1 [USP'U]/G; MG/G
OINTMENT TOPICAL 2 TIMES DAILY
Qty: 15 G | Refills: 0 | Status: SHIPPED | OUTPATIENT
Start: 2025-05-14

## 2025-05-14 ASSESSMENT — ENCOUNTER SYMPTOMS
VOMITING: 0
CHILLS: 0
DIARRHEA: 0
HEMATURIA: 0
ABDOMINAL PAIN: 1
DYSURIA: 0
FLANK PAIN: 1
FREQUENCY: 0
BACK PAIN: 1
NAUSEA: 0
HEADACHES: 0
CONSTIPATION: 0
FEVER: 0

## 2025-05-14 NOTE — PROGRESS NOTES
Chief Complaint    STD CHECK        HPI    PT WOULD LIKE TO DISCUSS FIBROIDS AND WOULD LIKE AN STD CHECK  Last edited by Kelly River MA on 5/14/2025  1:37 PM.         25 y.o. G0 female presents for STD check, vulvar itching, and family h/o uterine fibroids.    Mom in her 50s. Recently diagnosed with uterine fibroids. Patient would like to be checked for this.  Pelvic US 10/21/2024: Normal, no uterine fibroids.  Her periods are regular every 28 days, lasting 3-4 days. Are manageable and not significantly heavy or painful.   She has been having left sided pelvic pain. Radiates around to lower back. Uncertain if this is related to her issues with back pain.     She reports a labial cyst, has since popped and drain.  Since then she has been having vulvar itching around top of clitoral garza.  Clear vaginal discharge, no foul odor.    Requests STD testing today including blood work.   H/O chlamydia, mycoplasma, and HSV.     PMH: None  Family h/o breast cancer: None  Family h/o GYN cancer: Mother - cervical  Family h/o colon cancer: None    /76   Wt 91.2 kg (201 lb)   BMI 29.68 kg/m²      Current Outpatient Medications   Medication Instructions    fluconazole (Diflucan) 150 mg tablet Take 1 tablet by mouth once as needed after completing antibiotics.        Review of Systems   Constitutional:  Negative for chills and fever.   Gastrointestinal:  Positive for abdominal pain. Negative for constipation, diarrhea, nausea and vomiting.   Genitourinary:  Positive for flank pain, pelvic pain and vaginal discharge. Negative for dysuria, frequency, hematuria and urgency.   Musculoskeletal:  Positive for back pain.   Skin:  Negative for rash.   Neurological:  Negative for headaches.   All other systems reviewed and are negative.       Physical Exam  Constitutional:       Appearance: Normal appearance.   HENT:      Head: Normocephalic.      Nose: Nose normal.   Pulmonary:      Effort: Pulmonary effort is normal.    Genitourinary:     Vagina: Normal.      Cervix: Normal.      Uterus: Normal.       Adnexa: Right adnexa normal and left adnexa normal.          Comments: Mild erythema at clitoral garza with small fissure  Musculoskeletal:         General: Normal range of motion.      Cervical back: Normal range of motion and neck supple.   Neurological:      Mental Status: She is alert.   Psychiatric:         Mood and Affect: Mood normal.         Behavior: Behavior normal.          Assessment/Plan:  1. Screening for STDs (sexually transmitted diseases) (Primary)  -Cultures collected and labs ordered:  - C. trachomatis / N. gonorrhoeae, Amplified, Urogenital  - Trichomonas vaginalis, Amplified  - HIV 1/2 Antigen/Antibody Screen with Reflex to Confirmation; Future  - Hepatitis B Surface Antigen; Future  - Hepatitis C Antibody; Future  - Syphilis Screen with Reflex; Future  -Will notify of results.     2. Vaginal itching  -Collected:  - Vaginitis Gram Stain For Bacterial Vaginosis + Yeast  -Will notify of results.  -Vulvar skin care reviewed - no scented soaps or detergents, avoid dryer sheets, wear loose-fitting, cotton clothing, keep area dry.  -Rx sent: nystatin-triamcinolone (Mycolog II) ointment; Apply topically 2 times a day.  Dispense: 15 g; Refill: 0    3. Pelvic pain in female  -Exam normal.  -Discussed mom's diagnosis of uterine fibroids. Her recent pelvic ultrasound in October was normal, no uterine fibroids seen.  -Option to repeat pelvic ultrasound discussed to evaluate pelvic pain. Patient declines at this time but will follow up if pain persists or worsens.  -Go to ED for any sudden, severe pain that does not resolve or for any emergencies as needed.

## 2025-05-15 LAB
BV SCORE VAG QL: NORMAL
C TRACH RRNA SPEC QL NAA+PROBE: NOT DETECTED
N GONORRHOEA RRNA SPEC QL NAA+PROBE: NOT DETECTED
QUEST GC CT AMPLIFIED (ALWAYS MESSAGE): NORMAL
T VAGINALIS RRNA SPEC QL NAA+PROBE: NOT DETECTED

## 2025-05-17 LAB
HBV SURFACE AG SERPL QL IA: NORMAL
HCV AB SERPL QL IA: NORMAL
HIV 1+2 AB+HIV1 P24 AG SERPL QL IA: NORMAL
T PALLIDUM AB SER QL IA: NEGATIVE